# Patient Record
Sex: MALE | Race: WHITE | Employment: FULL TIME | URBAN - NONMETROPOLITAN AREA
[De-identification: names, ages, dates, MRNs, and addresses within clinical notes are randomized per-mention and may not be internally consistent; named-entity substitution may affect disease eponyms.]

---

## 2023-10-02 ENCOUNTER — HOSPITAL ENCOUNTER (EMERGENCY)
Age: 48
Discharge: HOME | End: 2023-10-02
Payer: COMMERCIAL

## 2023-10-02 VITALS
SYSTOLIC BLOOD PRESSURE: 142 MMHG | WEIGHT: 210 LBS | HEART RATE: 88 BPM | RESPIRATION RATE: 16 BRPM | OXYGEN SATURATION: 97 % | TEMPERATURE: 97.7 F | BODY MASS INDEX: 28.44 KG/M2 | HEIGHT: 72 IN | DIASTOLIC BLOOD PRESSURE: 86 MMHG

## 2023-10-02 DIAGNOSIS — L23.7 POISON IVY DERMATITIS: Primary | ICD-10-CM

## 2023-10-02 PROCEDURE — 6360000002 HC RX W HCPCS: Performed by: NURSE PRACTITIONER

## 2023-10-02 PROCEDURE — 96372 THER/PROPH/DIAG INJ SC/IM: CPT

## 2023-10-02 PROCEDURE — 99213 OFFICE O/P EST LOW 20 MIN: CPT

## 2023-10-02 PROCEDURE — 99202 OFFICE O/P NEW SF 15 MIN: CPT | Performed by: NURSE PRACTITIONER

## 2023-10-02 RX ORDER — METHYLPREDNISOLONE ACETATE 80 MG/ML
120 INJECTION, SUSPENSION INTRA-ARTICULAR; INTRALESIONAL; INTRAMUSCULAR; SOFT TISSUE ONCE
Status: COMPLETED | OUTPATIENT
Start: 2023-10-02 | End: 2023-10-02

## 2023-10-02 RX ORDER — SKIN CLEANSER COMB NO.41
CLEANSER (ML) TOPICAL
Refills: 0 | COMMUNITY
Start: 2023-10-02

## 2023-10-02 RX ORDER — METHYLPREDNISOLONE ACETATE 80 MG/ML
80 INJECTION, SUSPENSION INTRA-ARTICULAR; INTRALESIONAL; INTRAMUSCULAR; SOFT TISSUE ONCE
Status: DISCONTINUED | OUTPATIENT
Start: 2023-10-02 | End: 2023-10-02

## 2023-10-02 RX ORDER — PREDNISONE 20 MG/1
TABLET ORAL
Qty: 15 TABLET | Refills: 0 | Status: SHIPPED | OUTPATIENT
Start: 2023-10-02 | End: 2023-10-12

## 2023-10-02 RX ORDER — DIPHENHYDRAMINE HCL 25 MG
25 CAPSULE ORAL EVERY 6 HOURS PRN
Refills: 0 | COMMUNITY
Start: 2023-10-02 | End: 2023-10-12

## 2023-10-02 RX ADMIN — METHYLPREDNISOLONE ACETATE 120 MG: 80 INJECTION, SUSPENSION INTRA-ARTICULAR; INTRALESIONAL; INTRAMUSCULAR; SOFT TISSUE at 19:04

## 2023-10-02 ASSESSMENT — ENCOUNTER SYMPTOMS
STRIDOR: 0
ABDOMINAL PAIN: 0
CHOKING: 0
PERI-ORBITAL EDEMA: 0
NAUSEA: 0
CHEST TIGHTNESS: 0
HOARSE VOICE: 0
VOMITING: 0
APNEA: 0
SHORTNESS OF BREATH: 0
DIARRHEA: 0
WHEEZING: 0
THROAT SWELLING: 0
SORE THROAT: 0
COUGH: 0

## 2023-10-02 ASSESSMENT — PAIN - FUNCTIONAL ASSESSMENT: PAIN_FUNCTIONAL_ASSESSMENT: NONE - DENIES PAIN

## 2023-10-02 NOTE — ED NOTES
Pt denies any pain at injection site. VS re checked. Pt ambulated out in stable condition with respirations easy and unlabored. No change in pain noted upon discharge.      Atilio Mariano RN  10/02/23 8125

## 2023-10-02 NOTE — ED PROVIDER NOTES
[address] [facility]  Urgent Care Encounter      CHIEF COMPLAINT       Chief Complaint   Patient presents with    Poison Julissa       Nurses Notes reviewed and I agree except as noted in the HPI. HISTORY OFPRESENT ILLNESS   Brice Haro is a 57 y.o. The history is provided by the patient. No  was used. Rash  Location:  Shoulder/arm  Quality: itchiness, redness and swelling    Quality: not blistering, not bruising, not burning, not draining, not dry, not painful, not peeling, not scaling and not weeping    Severity:  Severe  Onset quality:  Gradual  Duration:  5 days  Timing:  Constant  Progression:  Spreading  Chronicity:  New  Context: animal contact, plant contact, pollen and sun exposure    Context: not chemical exposure, not diapers, not eggs, not exposure to similar rash, not food, not hot tub use, not insect bite/sting, not medications, not new detergent/soap, not nuts, not pregnancy and not sick contacts    Relieved by:  Nothing  Worsened by:  Heat and moisture  Ineffective treatments:  Anti-itch cream  Associated symptoms: induration    Associated symptoms: no abdominal pain, no diarrhea, no fatigue, no fever, no headaches, no hoarse voice, no joint pain, no myalgias, no nausea, no periorbital edema, no shortness of breath, no sore throat, no throat swelling, no tongue swelling, no URI, not vomiting and not wheezing        REVIEW OF SYSTEMS     Review of Systems   Constitutional:  Negative for activity change, appetite change, chills, diaphoresis, fatigue and fever. HENT:  Negative for hoarse voice and sore throat. Respiratory:  Negative for apnea, cough, choking, chest tightness, shortness of breath, wheezing and stridor. Cardiovascular:  Negative for chest pain, palpitations and leg swelling. Gastrointestinal:  Negative for abdominal pain, diarrhea, nausea and vomiting. Musculoskeletal:  Negative for arthralgias and myalgias. Skin:  Positive for rash. (1.829 m)        Medications   methylPREDNISolone acetate (DEPO-MEDROL) injection 120 mg (120 mg IntraMUSCular Given 10/2/23 1904)     PROCEDURES:  None  FINAL IMPRESSION      1. Poison ivy dermatitis        DISPOSITION/PLAN   Decision To Discharge    Take Medication as Directed  Benadryl for itch, Don't scratch  Monitor for any increase in size or spreading  Monitor for fever or chills  Keep drainage covered if any. Follow up with your PCP or return as needed  Or go to the emergency Department       REFERRED TO:  [address]  [address]  [address] [facility] [address]  Call today      DISCHARGE MEDICATIONS:  Discharge Medication List as of 10/2/2023  7:18 PM        START taking these medications    Details   predniSONE (DELTASONE) 20 MG tablet Take 2 tablets by mouth daily for 5 days, THEN 1 tablet daily for 5 days. , Disp-15 tablet, R-0Normal      diphenhydrAMINE (BENADRYL) 25 MG capsule Take 1 capsule by mouth every 6 hours as needed for Itching, R-0OTC      Poison Ivy Treatments (TECNU OUTDOOR SKIN CLEANSER) LIQD Package directions, R-0OTC           Discharge Medication List as of 10/2/2023  7:18 PM          TY Andrew CNP, APRN - CNP  10/02/23 1940